# Patient Record
Sex: FEMALE | Race: WHITE | NOT HISPANIC OR LATINO | Employment: FULL TIME | URBAN - METROPOLITAN AREA
[De-identification: names, ages, dates, MRNs, and addresses within clinical notes are randomized per-mention and may not be internally consistent; named-entity substitution may affect disease eponyms.]

---

## 2019-03-27 ENCOUNTER — OFFICE VISIT (OUTPATIENT)
Dept: URGENT CARE | Facility: URGENT CARE | Age: 39
End: 2019-03-27
Payer: COMMERCIAL

## 2019-03-27 ENCOUNTER — ANCILLARY PROCEDURE (OUTPATIENT)
Dept: GENERAL RADIOLOGY | Facility: CLINIC | Age: 39
End: 2019-03-27
Attending: PHYSICIAN ASSISTANT
Payer: COMMERCIAL

## 2019-03-27 VITALS
TEMPERATURE: 98.4 F | DIASTOLIC BLOOD PRESSURE: 64 MMHG | OXYGEN SATURATION: 98 % | WEIGHT: 287 LBS | SYSTOLIC BLOOD PRESSURE: 102 MMHG | HEART RATE: 60 BPM

## 2019-03-27 DIAGNOSIS — R07.81 RIB PAIN ON LEFT SIDE: ICD-10-CM

## 2019-03-27 DIAGNOSIS — R07.81 RIB PAIN ON LEFT SIDE: Primary | ICD-10-CM

## 2019-03-27 LAB
BASOPHILS # BLD AUTO: 0 10E9/L (ref 0–0.2)
BASOPHILS NFR BLD AUTO: 0.3 %
DIFFERENTIAL METHOD BLD: NORMAL
EOSINOPHIL # BLD AUTO: 0.1 10E9/L (ref 0–0.7)
EOSINOPHIL NFR BLD AUTO: 0.8 %
ERYTHROCYTE [DISTWIDTH] IN BLOOD BY AUTOMATED COUNT: 13.6 % (ref 10–15)
HCT VFR BLD AUTO: 40.7 % (ref 35–47)
HGB BLD-MCNC: 13.2 G/DL (ref 11.7–15.7)
LYMPHOCYTES # BLD AUTO: 2.4 10E9/L (ref 0.8–5.3)
LYMPHOCYTES NFR BLD AUTO: 33.3 %
MCH RBC QN AUTO: 27.4 PG (ref 26.5–33)
MCHC RBC AUTO-ENTMCNC: 32.4 G/DL (ref 31.5–36.5)
MCV RBC AUTO: 84 FL (ref 78–100)
MONOCYTES # BLD AUTO: 0.6 10E9/L (ref 0–1.3)
MONOCYTES NFR BLD AUTO: 8.5 %
NEUTROPHILS # BLD AUTO: 4.2 10E9/L (ref 1.6–8.3)
NEUTROPHILS NFR BLD AUTO: 57.1 %
PLATELET # BLD AUTO: 268 10E9/L (ref 150–450)
RBC # BLD AUTO: 4.82 10E12/L (ref 3.8–5.2)
WBC # BLD AUTO: 7.3 10E9/L (ref 4–11)

## 2019-03-27 PROCEDURE — 36415 COLL VENOUS BLD VENIPUNCTURE: CPT | Performed by: PHYSICIAN ASSISTANT

## 2019-03-27 PROCEDURE — 71101 X-RAY EXAM UNILAT RIBS/CHEST: CPT | Mod: LT

## 2019-03-27 PROCEDURE — 99203 OFFICE O/P NEW LOW 30 MIN: CPT | Performed by: PHYSICIAN ASSISTANT

## 2019-03-27 PROCEDURE — 85025 COMPLETE CBC W/AUTO DIFF WBC: CPT | Performed by: PHYSICIAN ASSISTANT

## 2019-03-27 RX ORDER — TRIAMCINOLONE ACETONIDE 1 MG/G
CREAM TOPICAL
COMMUNITY
Start: 2018-10-03 | End: 2022-02-12

## 2019-03-27 RX ORDER — IBUPROFEN 800 MG/1
800 TABLET, FILM COATED ORAL EVERY 8 HOURS PRN
Qty: 15 TABLET | Refills: 0 | Status: SHIPPED | OUTPATIENT
Start: 2019-03-27 | End: 2019-04-01

## 2019-03-27 RX ORDER — CYCLOBENZAPRINE HCL 10 MG
10 TABLET ORAL AT BEDTIME
Qty: 14 TABLET | Refills: 0 | Status: SHIPPED | OUTPATIENT
Start: 2019-03-27 | End: 2019-04-10

## 2019-03-27 RX ORDER — TRAZODONE HYDROCHLORIDE 50 MG/1
50-100 TABLET, FILM COATED ORAL
COMMUNITY
Start: 2018-10-03 | End: 2019-10-03

## 2019-03-27 NOTE — PATIENT INSTRUCTIONS
Follow up right away with any red flag symptoms or with PCP if fail to improve    Patient Education     Rib Contusion or Minor Fracture    A rib contusion is a bruise to one or more rib bones. It may cause pain, tenderness, swelling, and a purplish color to the skin. There may be a sharp pain with each breath. A rib contusion takes anywhere from a few days to a few weeks to heal. A minor rib fracture or break may cause the same symptoms as a rib contusion. The small crack may not be seen on a regular chest X-ray. Treatment for both problems is basically the same.  Home care    You may use over-the-counter pain medicine to control pain, unless another pain medicine was prescribed. If you have chronic liver or kidney disease or ever had a stomach ulcer or GI (gastrointestinal) bleeding, talk with your healthcare provider before using these medicines.    Rest. Don't lift anything heavy or do any activity that causes pain.    Apply an ice pack over the injured area for 15 to 20 minutes every 1 to 2 hours. You should do this for the first 24 to 48 hours. To make an ice pack, put ice cubes in a plastic bag that seals at the top. Wrap the bag in a clean, thin towel or cloth. Never put ice or an ice pack directly on the skin. Continue with ice packs as needed for the relief of pain and swelling.    The first 3 to 4 weeks of healing will be the most painful. If your pain is not under control with the treatment given, call your healthcare provider. Sometimes a stronger pain medicine may be needed. A nerve block can be done in case of severe pain. It will numb the nerve between the ribs.  Follow-up care  Follow up with your healthcare provider, or as advised.  If X-rays were taken, you will be told of any new findings that may affect your care.  Call 911  Call 911 if you have:    Dizziness, weakness or fainting    Shortness of breath with or without chest discomfort    New or worsening pain  When to seek medical advice  Call  your healthcare provider right away if any of these occur:    Fever of 100.4 F (38 C) or higher, or as directed by your healthcare provider    Chills    Stomach pain, vomiting  Date Last Reviewed: 6/1/2018 2000-2018 [employer]. [address] [place] [id number]. All rights reserved. This information is not intended as a substitute for professional medical care. Always follow your healthcare professional's instructions.

## 2019-03-27 NOTE — PROGRESS NOTES
SUBJECTIVE:  Chief Complaint   Patient presents with     Urgent Care     Abdominal Pain     sharp, throbbing, achy pain on LLQ abdominal. 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 Tx: FRANCISCO         Alla Briones is a 38 year old female presents with a chief complaint of left rib pain.  The injury occurred 1 day(s) ago.   The injury happened while at work. How: lifting a child from their crib.   The patient complained of moderate pain  and has had decreased ROM.  Pain exacerbated by twisting and flexion/extension.  Relieved by rest.  She treated it initially with no therapy. This is the first time this type of injury has occurred to this patient.     History reviewed. No pertinent past medical history.  Current Outpatient Medications   Medication Sig Dispense Refill     cyclobenzaprine (FLEXERIL) 10 MG tablet Take 1 tablet (10 mg) by mouth At Bedtime for 14 days 14 tablet 0     hydrocortisone (ANUSOL-HC) 2.5 % cream APPLY RECTALLY TWO TIMES A DAY  0     ibuprofen (ADVIL/MOTRIN) 800 MG tablet Take 1 tablet (800 mg) by mouth every 8 hours as needed for moderate pain 15 tablet 0     traZODone (DESYREL) 50 MG tablet Take  mg by mouth       triamcinolone (KENALOG) 0.1 % external cream        Social History     Tobacco Use     Smoking status: Never Smoker     Smokeless tobacco: Never Used   Substance Use Topics     Alcohol use: Yes     Comment: occasionally       ROS:  Review of systems negative except as stated above.    EXAM:   /64   Pulse 60   Temp 98.4  F (36.9  C) (Oral)   Wt 130.2 kg (287 lb)   SpO2 98%   Gen: healthy,alert,no distress  Ribs: 10th rib acutely tender, no stepoffs    CHEST: clear to auscultation  CV: regular rate and rhythm  MS: no gross deformities noted, no evidence of inflammation in joints, FROM in all extremities.  SKIN: no suspicious lesions or rashes  NEURO: Normal strength and tone, sensory exam grossly normal,  mentation intact and speech normal  Abdomen: no abnormal sounds, tenderness    X-RAY was not done.    ASSESSMENT:   (R07.81) Rib pain on left side  (primary encounter diagnosis)  Plan: CBC with platelets and differential, XR Ribs &         Chest Left G/E 3 Views, ibuprofen         (ADVIL/MOTRIN) 800 MG tablet, cyclobenzaprine         (FLEXERIL) 10 MG tablet  Red flags and emergent follow up discussed, and understood by patient  Follow up with PCP if symptoms worsen or fail to improve      Patient Instructions   Follow up right away with any red flag symptoms or with PCP if fail to improve    Patient Education     Rib Contusion or Minor Fracture    A rib contusion is a bruise to one or more rib bones. It may cause pain, tenderness, swelling, and a purplish color to the skin. There may be a sharp pain with each breath. A rib contusion takes anywhere from a few days to a few weeks to heal. A minor rib fracture or break may cause the same symptoms as a rib contusion. The small crack may not be seen on a regular chest X-ray. Treatment for both problems is basically the same.  Home care    You may use over-the-counter pain medicine to control pain, unless another pain medicine was prescribed. If you have chronic liver or kidney disease or ever had a stomach ulcer or GI (gastrointestinal) bleeding, talk with your healthcare provider before using these medicines.    Rest. Don't lift anything heavy or do any activity that causes pain.    Apply an ice pack over the injured area for 15 to 20 minutes every 1 to 2 hours. You should do this for the first 24 to 48 hours. To make an ice pack, put ice cubes in a plastic bag that seals at the top. Wrap the bag in a clean, thin towel or cloth. Never put ice or an ice pack directly on the skin. Continue with ice packs as needed for the relief of pain and swelling.    The first 3 to 4 weeks of healing will be the most painful. If your pain is not under control with the treatment given,  call your healthcare provider. Sometimes a stronger pain medicine may be needed. A nerve block can be done in case of severe pain. It will numb the nerve between the ribs.  Follow-up care  Follow up with your healthcare provider, or as advised.  If X-rays were taken, you will be told of any new findings that may affect your care.  Call 911  Call 911 if you have:    Dizziness, weakness or fainting    Shortness of breath with or without chest discomfort    New or worsening pain  When to seek medical advice  Call your healthcare provider right away if any of these occur:    Fever of 100.4 F (38 C) or higher, or as directed by your healthcare provider    Chills    Stomach pain, vomiting  Date Last Reviewed: 6/1/2018 2000-2018 [employer]. [address] [place] [address], [place] [address]. All rights reserved. This information is not intended as a substitute for professional medical care. Always follow your healthcare professional's instructions.

## 2019-04-01 ENCOUNTER — NURSE TRIAGE (OUTPATIENT)
Dept: NURSING | Facility: CLINIC | Age: 39
End: 2019-04-01

## 2019-04-01 ENCOUNTER — TELEPHONE (OUTPATIENT)
Dept: URGENT CARE | Facility: URGENT CARE | Age: 39
End: 2019-04-01

## 2019-04-01 NOTE — TELEPHONE ENCOUNTER
Clinic Action Needed:  Please contact patient at [phone].  Reason for Call:  Patient requesting refill of ibuprofen.  1 tablet wasn't providing relief, so patient was taking 2 tablets for the past couple of days.  Preferred pharmacy is [id number].  Routed to:  [facility]    Please close encounter when completed.

## 2019-04-01 NOTE — TELEPHONE ENCOUNTER
Patient requesting refill of ibuprofen.  1 tablet wasn't providing relief, so patient was taking 2 tablets for the past couple of days.  Preferred pharmacy is [facility] #[id number].  Routed to [facility].

## 2019-04-02 NOTE — TELEPHONE ENCOUNTER
Per provider message pt needs to follow up woth her primary care doctor for follow up needs.  Spoke to patient and relayed that message to her.    Christine Zelaya CMA 4/2/2019 1:37 PM

## 2019-10-14 ENCOUNTER — HOSPITAL ENCOUNTER (EMERGENCY)
Facility: CLINIC | Age: 39
Discharge: HOME | End: 2019-10-14
Attending: NURSE PRACTITIONER | Admitting: NURSE PRACTITIONER
Payer: COMMERCIAL

## 2019-10-14 VITALS
HEART RATE: 76 BPM | SYSTOLIC BLOOD PRESSURE: 155 MMHG | DIASTOLIC BLOOD PRESSURE: 97 MMHG | TEMPERATURE: 98.2 F | RESPIRATION RATE: 18 BRPM | OXYGEN SATURATION: 98 %

## 2019-10-14 DIAGNOSIS — S61.209A AVULSION OF FINGER TIP, INITIAL ENCOUNTER: ICD-10-CM

## 2019-10-14 PROCEDURE — 25000125 ZZHC RX 250: Performed by: NURSE PRACTITIONER

## 2019-10-14 PROCEDURE — 25000128 H RX IP 250 OP 636: Performed by: NURSE PRACTITIONER

## 2019-10-14 PROCEDURE — 90715 TDAP VACCINE 7 YRS/> IM: CPT | Performed by: NURSE PRACTITIONER

## 2019-10-14 PROCEDURE — 90471 IMMUNIZATION ADMIN: CPT

## 2019-10-14 PROCEDURE — 99283 EMERGENCY DEPT VISIT LOW MDM: CPT | Mod: 25

## 2019-10-14 RX ORDER — LIDOCAINE HYDROCHLORIDE 20 MG/ML
5 SOLUTION OROPHARYNGEAL ONCE
Status: COMPLETED | OUTPATIENT
Start: 2019-10-14 | End: 2019-10-14

## 2019-10-14 RX ADMIN — LIDOCAINE HYDROCHLORIDE 5 ML: 20 SOLUTION ORAL; TOPICAL at 18:08

## 2019-10-14 RX ADMIN — CLOSTRIDIUM TETANI TOXOID ANTIGEN (FORMALDEHYDE INACTIVATED), CORYNEBACTERIUM DIPHTHERIAE TOXOID ANTIGEN (FORMALDEHYDE INACTIVATED), BORDETELLA PERTUSSIS TOXOID ANTIGEN (GLUTARALDEHYDE INACTIVATED), BORDETELLA PERTUSSIS FILAMENTOUS HEMAGGLUTININ ANTIGEN (FORMALDEHYDE INACTIVATED), BORDETELLA PERTUSSIS PERTACTIN ANTIGEN, AND BORDETELLA PERTUSSIS FIMBRIAE 2/3 ANTIGEN 0.5 ML: 5; 2; 2.5; 5; 3; 5 INJECTION, SUSPENSION INTRAMUSCULAR at 18:52

## 2019-10-14 ASSESSMENT — ENCOUNTER SYMPTOMS
NUMBNESS: 0
WOUND: 1
WEAKNESS: 0
MYALGIAS: 1

## 2019-10-14 NOTE — ED AVS SNAPSHOT
[facility] Emergency Department  [address]  [facility] [address]  Phone:  [phone]  Fax:  [phone]                                    Alla Briones   MRN: [id number]    Department:  [facility] Emergency Department   Date of Visit:  10/14/2019           After Visit Summary Signature Page    I have received my discharge instructions, and my questions have been answered. I have discussed any challenges I see with this plan with the nurse or doctor.    ..........................................................................................................................................  Patient/Patient Representative Signature      ..........................................................................................................................................  Patient Representative Print Name and Relationship to Patient    ..................................................               ................................................  Date                                   Time    ..........................................................................................................................................  Reviewed by Signature/Title    ...................................................              ..............................................  Date                                               Time          22EPIC Rev 08/18

## 2019-10-14 NOTE — ED TRIAGE NOTES
Pt was using a mandolin to cut zucchini and sliced her middle finger of her rt hand, bleeding controlled with pressure.

## 2019-10-14 NOTE — ED PROVIDER NOTES
History     Chief Complaint:  Laceration    HPI  Alal Briones is a right hand dominant 39 year old female who presents to the emergency department today for evaluation of a laceration. The patient reports that she was using a mandolin to cut zucchini when she sliced her right fourth finger on the blade. The bleeding is controlled with pressure applied. She has pain to the tip of the finger but has normal sensation and movement. Last Tetanus was 8/24/2009.    Allergies:  No known drug allergies    Medications:    hydrocortisone (ANUSOL-HC) 2.5 % cream  traZODone (DESYREL) 50 MG tablet  triamcinolone (KENALOG) 0.1 % external cream    Past Medical History:    The patient does not have any past pertinent medical history.    Past Surgical History:    History reviewed. No pertinent surgical history.    Family History:    History reviewed. No pertinent family history.     Social History:  The patient reports that she has never smoked. She has never used smokeless tobacco. She reports current alcohol use. She reports that she does not use drugs.   Marital Status: Single    Review of Systems   Musculoskeletal: Positive for myalgias (right ring finger).   Skin: Positive for wound.   Neurological: Negative for weakness and numbness.   All other systems reviewed and are negative.      Physical Exam     Patient Vitals for the past 24 hrs:   BP Temp Temp src Pulse Resp SpO2   10/14/19 1740 (!) 155/97 98.2  F (36.8  C) Oral 76 18 98 %     Physical Exam  General: Alert, Mild  discomfort, well kept   HENT:  Normal voice, No lymphadenopathy  Eyes:  The pupils are equal, round, and reactive to light, Conjunctiva normal, No scleral icterus   Neck:  Normal range of motion  CV:  Normal Pulses, Normal cap refill  Resp:  Non-labored, No cough  MS:  Distal tip of right ring finger avulsion type laceration.  There is very distal portion of the nail plate involved.  No significant nailbed injury., No indication for Flexor or extensor tendon  injury, Normal ROM if all digits  Skin:  No rash or acute skin lesions noted  Neuro:  Speech is normal and fluent, Normal sensation  Psych: Awake. Alert.  Normal affect.  Appropriate interactions. Good eye contact    Emergency Department Course     Interventions:  1808: Lidocaine 2% 5 mL Topical solution  1852: Tdap 0.5mL IM injection    Emergency Department Course:  Past medical records, nursing notes, and vitals reviewed.  1757: I performed an exam of the patient and obtained history, as documented above. GCS 15.    1842: I rechecked the patient. Findings and plan explained to the Patient. Patient discharged home with instructions regarding supportive care, medications, and reasons to return. The importance of close follow-up was reviewed.     Impression & Plan      Medical Decision Making:  Findings and exam were consistent with uncomplicated avulsion type laceration of right fourth finger. The wound was carefully evaluated and explored. Not sutureable. There is no evidence at this time of bony injury, foreign body, deep space infection, or neurovascular injury. Follow up in 2-3 days time if concerns over healing. Possible complications (infection, scarring) were reviewed with the patient.  Indications for immediate return to ER/UR were reviewed and included but are not limited to, redness, fevers, drainage, increasing pain, high fevers, or other concerns.    Diagnosis:    ICD-10-CM   1. Avulsion of finger tip, initial encounter S61.209A       Disposition:   Discharged.      Scribe Disclosure:  I, India Montes, am serving as a scribe at 6:13 PM on 10/14/2019 to document services personally performed by Malachi Villar APRN based on my observations and the provider's statements to me.     [facility] EMERGENCY DEPARTMENT       Malachi Villar APRN CNP  10/14/19 1923

## 2021-11-10 ENCOUNTER — OFFICE VISIT (OUTPATIENT)
Dept: URGENT CARE | Facility: URGENT CARE | Age: 41
End: 2021-11-10
Payer: OTHER MISCELLANEOUS

## 2021-11-10 ENCOUNTER — ANCILLARY PROCEDURE (OUTPATIENT)
Dept: GENERAL RADIOLOGY | Facility: CLINIC | Age: 41
End: 2021-11-10
Attending: FAMILY MEDICINE
Payer: OTHER MISCELLANEOUS

## 2021-11-10 VITALS
OXYGEN SATURATION: 100 % | TEMPERATURE: 98.2 F | HEART RATE: 59 BPM | SYSTOLIC BLOOD PRESSURE: 136 MMHG | DIASTOLIC BLOOD PRESSURE: 71 MMHG

## 2021-11-10 DIAGNOSIS — R51.9 ACUTE NONINTRACTABLE HEADACHE, UNSPECIFIED HEADACHE TYPE: ICD-10-CM

## 2021-11-10 DIAGNOSIS — S09.92XA NOSE INJURY, INITIAL ENCOUNTER: Primary | ICD-10-CM

## 2021-11-10 DIAGNOSIS — S09.92XA NOSE INJURY, INITIAL ENCOUNTER: ICD-10-CM

## 2021-11-10 PROCEDURE — 99213 OFFICE O/P EST LOW 20 MIN: CPT | Performed by: FAMILY MEDICINE

## 2021-11-10 PROCEDURE — 70160 X-RAY EXAM OF NASAL BONES: CPT | Performed by: RADIOLOGY

## 2021-11-10 RX ORDER — TRAZODONE HYDROCHLORIDE 100 MG/1
50-150 TABLET ORAL
COMMUNITY
Start: 2021-07-15 | End: 2024-02-12

## 2021-11-10 RX ORDER — ACYCLOVIR 400 MG/1
TABLET ORAL
COMMUNITY
Start: 2021-05-24 | End: 2023-03-18

## 2021-11-10 RX ORDER — SERTRALINE HYDROCHLORIDE 100 MG/1
150 TABLET, FILM COATED ORAL
COMMUNITY
Start: 2021-07-06
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
## 2022-02-12 ENCOUNTER — OFFICE VISIT (OUTPATIENT)
Dept: URGENT CARE | Facility: URGENT CARE | Age: 42
End: 2022-02-12
Payer: COMMERCIAL

## 2022-02-12 VITALS
HEART RATE: 78 BPM | SYSTOLIC BLOOD PRESSURE: 138 MMHG | OXYGEN SATURATION: 98 % | DIASTOLIC BLOOD PRESSURE: 84 MMHG | TEMPERATURE: 98 F | RESPIRATION RATE: 20 BRPM

## 2022-02-12 DIAGNOSIS — R10.9 RIGHT FLANK PAIN: ICD-10-CM

## 2022-02-12 DIAGNOSIS — R68.83 CHILLS: ICD-10-CM

## 2022-02-12 DIAGNOSIS — B96.89 BV (BACTERIAL VAGINOSIS): ICD-10-CM

## 2022-02-12 DIAGNOSIS — R50.9 FEVER IN ADULT: ICD-10-CM

## 2022-02-12 DIAGNOSIS — R35.0 URINARY FREQUENCY: ICD-10-CM

## 2022-02-12 DIAGNOSIS — R30.0 DYSURIA: ICD-10-CM

## 2022-02-12 DIAGNOSIS — N10 PYELONEPHRITIS, ACUTE: Primary | ICD-10-CM

## 2022-02-12 DIAGNOSIS — N76.0 BV (BACTERIAL VAGINOSIS): ICD-10-CM

## 2022-02-12 LAB
ALBUMIN UR-MCNC: >=300 MG/DL
APPEARANCE UR: ABNORMAL
BACTERIA #/AREA URNS HPF: ABNORMAL /HPF
BASOPHILS # BLD AUTO: 0 10E3/UL (ref 0–0.2)
BASOPHILS NFR BLD AUTO: 0 %
BILIRUB UR QL STRIP: NEGATIVE
CLUE CELLS: PRESENT
COLOR UR AUTO: YELLOW
EOSINOPHIL # BLD AUTO: 0 10E3/UL (ref 0–0.7)
EOSINOPHIL NFR BLD AUTO: 0 %
ERYTHROCYTE [DISTWIDTH] IN BLOOD BY AUTOMATED COUNT: 13.2 % (ref 10–15)
GLUCOSE UR STRIP-MCNC: NEGATIVE MG/DL
HCT VFR BLD AUTO: 42.5 % (ref 35–47)
HGB BLD-MCNC: 13.8 G/DL (ref 11.7–15.7)
HGB UR QL STRIP: ABNORMAL
KETONES UR STRIP-MCNC: 15 MG/DL
LEUKOCYTE ESTERASE UR QL STRIP: ABNORMAL
LYMPHOCYTES # BLD AUTO: 1.4 10E3/UL (ref 0.8–5.3)
LYMPHOCYTES NFR BLD AUTO: 15 %
MCH RBC QN AUTO: 27.8 PG (ref 26.5–33)
MCHC RBC AUTO-ENTMCNC: 32.5 G/DL (ref 31.5–36.5)
MCV RBC AUTO: 86 FL (ref 78–100)
MONOCYTES # BLD AUTO: 0.9 10E3/UL (ref 0–1.3)
MONOCYTES NFR BLD AUTO: 9 %
NEUTROPHILS # BLD AUTO: 7.4 10E3/UL (ref 1.6–8.3)
NEUTROPHILS NFR BLD AUTO: 76 %
NITRATE UR QL: POSITIVE
PH UR STRIP: 5.5 [PH] (ref 5–7)
PLATELET # BLD AUTO: 252 10E3/UL (ref 150–450)
RBC # BLD AUTO: 4.96 10E6/UL (ref 3.8–5.2)
RBC #/AREA URNS AUTO: >100 /HPF
SP GR UR STRIP: 1.02 (ref 1–1.03)
SQUAMOUS #/AREA URNS AUTO: ABNORMAL /LPF
TRICHOMONAS, WET PREP: ABNORMAL
UROBILINOGEN UR STRIP-ACNC: 1 E.U./DL
WBC # BLD AUTO: 9.7 10E3/UL (ref 4–11)
WBC #/AREA URNS AUTO: >100 /HPF
WBC'S/HIGH POWER FIELD, WET PREP: ABNORMAL
YEAST, WET PREP: ABNORMAL

## 2022-02-12 PROCEDURE — 87210 SMEAR WET MOUNT SALINE/INK: CPT

## 2022-02-12 PROCEDURE — 81001 URINALYSIS AUTO W/SCOPE: CPT

## 2022-02-12 PROCEDURE — 36415 COLL VENOUS BLD VENIPUNCTURE: CPT | Performed by: PHYSICIAN ASSISTANT

## 2022-02-12 PROCEDURE — 80048 BASIC METABOLIC PNL TOTAL CA: CPT | Performed by: PHYSICIAN ASSISTANT

## 2022-02-12 PROCEDURE — 99215 OFFICE O/P EST HI 40 MIN: CPT | Mod: 25 | Performed by: PHYSICIAN ASSISTANT

## 2022-02-12 PROCEDURE — 87186 SC STD MICRODIL/AGAR DIL: CPT | Performed by: PHYSICIAN ASSISTANT

## 2022-02-12 PROCEDURE — 87086 URINE CULTURE/COLONY COUNT: CPT | Performed by: PHYSICIAN ASSISTANT

## 2022-02-12 PROCEDURE — 96372 THER/PROPH/DIAG INJ SC/IM: CPT | Performed by: PHYSICIAN ASSISTANT

## 2022-02-12 PROCEDURE — 87040 BLOOD CULTURE FOR BACTERIA: CPT | Performed by: PHYSICIAN ASSISTANT

## 2022-02-12 PROCEDURE — 85025 COMPLETE CBC W/AUTO DIFF WBC: CPT | Performed by: PHYSICIAN ASSISTANT

## 2022-02-12 RX ORDER — CEFDINIR 300 MG/1
300 CAPSULE ORAL 2 TIMES DAILY
Qty: 20 CAPSULE | Refills: 0 | Status: SHIPPED | OUTPATIENT
Start: 2022-02-12 | End: 2022-02-22

## 2022-02-12 RX ORDER — METRONIDAZOLE 7.5 MG/G
1 GEL VAGINAL DAILY
Qty: 25 G | Refills: 0 | Status: SHIPPED | OUTPATIENT
Start: 2022-02-12 | End: 2022-02-17

## 2022-02-12 RX ORDER — CEFTRIAXONE SODIUM 1 G
1 VIAL (EA) INJECTION ONCE
Status: COMPLETED | OUTPATIENT
Start: 2022-02-12 | End: 2022-02-12

## 2022-02-12 RX ADMIN — Medication 1 G: at 20:32

## 2022-02-13 LAB
ANION GAP SERPL CALCULATED.3IONS-SCNC: 6 MMOL/L (ref 3–14)
BUN SERPL-MCNC: 13 MG/DL (ref 7–30)
CALCIUM SERPL-MCNC: 9.2 MG/DL (ref 8.5–10.1)
CHLORIDE BLD-SCNC: 106 MMOL/L (ref 94–109)
CO2 SERPL-SCNC: 25 MMOL/L (ref 20–32)
CREAT SERPL-MCNC: 0.77 MG/DL (ref 0.52–1.04)
GFR SERPL CREATININE-BSD FRML MDRD: >90 ML/MIN/1.73M2
GLUCOSE BLD-MCNC: 101 MG/DL (ref 70–99)
POTASSIUM BLD-SCNC: 3.8 MMOL/L (ref 3.4–5.3)
SODIUM SERPL-SCNC: 137 MMOL/L (ref 133–144)

## 2022-02-13 NOTE — PATIENT INSTRUCTIONS
February 12, 2022 [facility]:     You were diagnosed with a kidney infection here today.  A kidney infection is a serious infection that has the potential to  spread into the bloodstream and become a life threatening infection, if not treated.     -You were given an injection of a strong antibiotic (Rocephin) in the muscle here today     -Please start the antibiotic I prescribed for you (Omnicef/Cefdinir)     -You should follow-up with your primary care provider on Monday (2 days from now) for a recheck and to review today's lab test results and your pending blood culture and urine culture results.     -If you develop any worsening of your symptoms, after the treatment provided here today, or if you develop any of the below, go directly to the emergency room:       Repeated vomiting    Increased fever/chills    Not able to take prescribed medicine due to nausea or another reason    Bloody, dark-colored, or foul smelling urine    Trouble urinating or decreased urine output    No urine for 8 hours, no tears when crying, confusion, sunken eyes, or dry mouth    Call 911  Call 911 if any of the following occur:    Trouble breathing    Fainting or loss of consciousness    Rapid or very slow heart rate    Weakness, dizziness, or fainting    Trouble arousing or confusion    You were also diagnosed with a vaginal infection (BV) today. I prescribed a vaginal gel to treat this infection.     Patient Education     Kidney Infection (Adult Female)     An infection in one or both kidneys is called pyelonephritis. It usually happens when bacteria ) get into the kidney. Rarely it is caused when other germs such as viruses, fungi, or other disease-causing organisms get into the kidney. The bacteria or other disease-causing organisms can enter the kidneys from the bladder or blood traveling from other parts of the body. A kidney infection can become serious. It can cause severe illness, scarring of the kidneys, or kidney  failure if not treated correctly.  Common causes for this problem include:    Not keeping the genital area clean and dry, which promotes the growth of bacteria    Wiping back to front. This drags bacteria from the rectum toward the urinary opening (urethra).    Wearing tight pants or underwear. This lets moisture build up in the genital area, which helps bacteria grow.    Holding urine in for long periods of time    Dehydration    Urinary tract infections    Blockages of urine draining from the kidney, such as a kidney stone  Kidney infections can cause symptoms similar to a bladder infection. Symptoms include:    Pain (or burning) when urinating    Having to urinate more often than usual    Blood in the urine (pink or red)    Belly (abdominal) pain or discomfort, usually in the lower abdomen    Pain in the side or back    Pain above the pubic bone    Fever or chills    Vomiting    Loss of appetite  Treatment is oral antibiotics. More severe cases are treated with intramuscular or IV (intravenous) antibiotics. These are started right away and may be changed once urine culture results show the infecting organisms. Treatment helps prevent a more serious kidney infection. Symptoms of kidney infections can vary based on your age.  Medicines  Medicines can help in the treatment of a bladder infection:    Take antibiotics exactly as prescribed and until they are used up, even if you feel better. It's important to finish them to make sure the infection is gone.    Unless another medicine was prescribed, you can use over-the-counter medicines for pain, fever, or discomfort. If you have chronic liver of kidney disease, talk with your healthcare provider before using these medicines. Also talk with your provider if you've ever had a stomach ulcer or digestive bleeding, or are taking blood thinners.  Home care  The following are general care guidelines:    Stay home from work or school. Rest in bed until your fever breaks and  you are feeling better, or as advised by your healthcare provider.    Drink lots of fluid unless you must restrict fluids for other medical reasons. This will force the medicine into your urinary system and flush the bacteria out of your body. Ask your healthcare provider how much you should drink.    Don't have sex until you have finished all of your medicine and your symptoms are gone.    Don't have caffeine, alcohol, or spicy foods. These foods may irritate the kidneys and bladder.    Don't take bubble baths. Sensitivity to the chemicals in bubble baths can irritate the urethra.    Make sure you wipe from front to back after using the toilet.    Wear loose cloths and cotton underwear.  Prevention  These self-care steps can help prevent future infections:    Drink plenty of fluids to prevent dehydration and flush out the bladder. Do this unless you must restrict fluids for other health reasons, or your healthcare provider told you not to.    Correct cleaning after going to the bathroom in important. Make sure you wipe from front to back after using the toilet.    Urinate more often. Don't try to hold urine in for a long time.    Don't wear tight-fitting pants and underwear.    Improve your diet to prevent constipation. Eat more fruits, vegetables, and fiber. Eat less junk and fatty foods. Constipation can make a urinary tract infection more likely. Talk with your healthcare provider if you have trouble with bowel movements.    Urinate right after sex to flush out the bladder.  Follow-up care  Follow up with your healthcare provider, or as advised. Additional testing may be needed to make sure the infection has cleared. Close follow-up and further testing is very important to find the cause and to prevent future infections.  If a urine culture was done, you will be contacted if your treatment needs to be changed. If directed, you may call to find out the results.  If you had an X-ray, CT scan, or other diagnostic  test, you will be notified of any new findings that may affect your care.  Call 911  Call 911 if any of the following occur:    Trouble breathing    Fainting or loss of consciousness    Rapid or very slow heart rate    Weakness, dizziness, or fainting    Trouble arousing or confusion  When to seek medical advice  Call your healthcare provider right away if any of these occur:    Fever 100.4 F (38 C) or higher, or as directed by your healthcare provider    Not feeling better or symptoms get worse within 1 to 2 days after starting antibiotics    Any symptom that continues after 3 days of treatment    Increasing pain in the stomach, back, side, or groin area    Repeated vomiting    Not able to take prescribed medicine due to nausea or another reason    Bloody, dark-colored, or foul smelling urine    Trouble urinating or decreased urine output    No urine for 8 hours, no tears when crying, confusion, sunken eyes, or dry mouth  Sadia last reviewed this educational content on 11/1/2019 2000-2021 [employer]. All rights reserved. This information is not intended as a substitute for professional medical care. Always follow your healthcare professional's instructions.               Patient Education     Bacterial Vaginosis    You have a vaginal infection called bacterial vaginosis (BV). Both good and bad bacteria are present in a healthy vagina. BV occurs when these bacteria get out of balance. The number of bad bacteria increase. And the number of good bacteria decrease. BV is linked with sexual activity, but it's not a sexually transmitted infection (STI).   BV may or may not cause symptoms. If symptoms do occur, they can include:     Thin, gray, milky-white, or sometimes green discharge    Unpleasant odor or  fishy  smell    Itching, burning, or pain in or around the vagina  It is not known what causes BV, but certain factors can make the problem more likely. These can include:      Douching    Spermicides    Use of antibiotics    Change in hormone levels with pregnancy, breastfeeding, or menopause    Having sex with a new partner    Having sex with more than one partner  BV will sometimes go away on its own. But treatment is often advised. This is because untreated BV can raise the risk of more serious health problems such as:     Pelvic inflammatory disease (PID)     delivery (giving birth to a baby early if you re pregnant)    HIV and some other sexually transmitted infections (STIs)    Infection after surgery on the reproductive organs  Home care  General care    BV is most often treated with medicines called antibiotics. These may be given as pills or as a vaginal cream. If antibiotics are prescribed, be sure to use them exactly as directed. And complete all of the medicine, even if your symptoms go away.    Don't douche or having sex during treatment.    If you have sex with a female partner, ask your healthcare provider if she should also be treated.  Prevention    Don't douche.    Don't have sex. If you do have sex, then take steps to lower your risk:  ? Use condoms when having sex.  ? Limit the number of sex partners you have.    Follow-up care  Follow up with your healthcare provider, or as advised.   When to get medical advice  Call your healthcare provider right away if:     You have a fever of 100.4 F (38 C) or higher, or as directed by your provider.    Your symptoms get worse, or they don t go away within a few days of starting treatment.    You have new pain in the lower belly or pelvic region.    You have side effects that bother you or a reaction to the pills or cream you re prescribed.    You or any of your sex partners have new symptoms, such as a rash, joint pain, or sores.  [employer]afgen last reviewed this educational content on 2020-2021 [employer]. All rights reserved. This information is not intended as a substitute for professional  medical care. Always follow your healthcare professional's instructions.

## 2022-02-13 NOTE — PROGRESS NOTES
ASSESSMENT/PLAN:    (N10) Pyelonephritis, acute  (primary encounter diagnosis)  MDM: Acute onset dysuria and urinary urgency/frequency x 5 days, sudden interval worsening with development of fever, shaking chills, and flank pain tonight. Above consistent with pyelonephritis. UA nitrite positive, >100 WBC, >100 RBCs. CBC with Diff WINL. BMP pending (tech unable to run here tonight), Wet prep positive for clue cells. Urine culture and blood cultures x 2 pending.    All above is reviewed with patient today. Copies of all tonight's lab test results are provided to hand carry to non-[place] PCP follow-up visit. Please see below patient discharge summary.     Plan: CBC with platelets and differential, Basic         metabolic panel  (Ca, Cl, CO2, Creat, Gluc, K,         Na, BUN), Blood Culture Peripheral Blood, Blood        Culture Peripheral Blood, cefTRIAXone         (ROCEPHIN) injection 1 g, cefdinir (OMNICEF)         300 MG capsule    February 12, 2022 [facility]:     You were diagnosed with a kidney infection here today.  A kidney infection is a serious infection that has the potential to  spread into the bloodstream and become a life threatening infection, if not treated.     -You were given an injection of a strong antibiotic (Rocephin) in the muscle here today     -Please start the antibiotic I prescribed for you (Omnicef/Cefdinir)     -You should follow-up with your primary care provider on Monday (2 days from now) for a recheck and to review today's lab test results and your pending blood culture and urine culture results.     -If you develop any worsening of your symptoms, after the treatment provided here today, or if you develop any of the below, go directly to the emergency room:       Repeated vomiting    Increased fever/chills    Not able to take prescribed medicine due to nausea or another reason    Bloody, dark-colored, or foul smelling urine    Trouble urinating or decreased urine  output    No urine for 8 hours, no tears when crying, confusion, sunken eyes, or dry mouth    Call 911  Call 911 if any of the following occur:    Trouble breathing    Fainting or loss of consciousness    Rapid or very slow heart rate    Weakness, dizziness, or fainting    Trouble arousing or confusion    You were also diagnosed with a vaginal infection (BV) today. I prescribed a vaginal gel to treat this infection.     (R10.9) Right flank pain  Plan: CBC with platelets and differential, Basic         metabolic panel  (Ca, Cl, CO2, Creat, Gluc, K,         Na, BUN), Blood Culture Peripheral Blood, Blood        Culture Peripheral Blood      (R50.9) Fever in adult  Plan: CBC with platelets and differential, Basic         metabolic panel  (Ca, Cl, CO2, Creat, Gluc, K,         Na, BUN), Blood Culture Peripheral Blood, Blood        Culture Peripheral Blood      (R35.0) Urinary frequency  Plan: UA reflex to Microscopic and Culture, Wet         preparation, Urine Microscopic Exam, Urine         Culture, CBC with platelets and differential,         Basic metabolic panel  (Ca, Cl, CO2, Creat,         Gluc, K, Na, BUN), Blood Culture Peripheral         Blood, Blood Culture Peripheral Blood      (N76.0,  B96.89) BV (bacterial vaginosis)  Plan: metroNIDAZOLE (METROGEL) 0.75 % vaginal gel      (R68.83) Chills  Plan: CBC with platelets and differential, Basic         metabolic panel  (Ca, Cl, CO2, Creat, Gluc, K,         Na, BUN), Blood Culture Peripheral Blood, Blood        Culture Peripheral Blood      (R30.0) Dysuria    ------------------------------------------------------------      Alla Briones is a very pleasant 41 year old female who presents to clinic today for evaluation of acute dysuria, urinary urgency/frequency and suprapubic area pressure x approximately 5 days duration. Tonight, she developed acute onset fever (100.4 with shaking chills), and right flank pain.    Of note: patient states she had an E-Visit with PCP  ([facility]) that included lab urinalysis. Patient states there were some abnormal findings, but PCP did not suspect UTI per patient report. She was reportedly treated with fluconazole (but had no symptoms of vaginal itching, abnormal vaginal discharge, or other GYN symptoms) at her E-Visit 3 days ago.     Past Urologic History: No prior history of kidney infections or kidney stones         ROS:     CONSITUTIONAL: Temperature of 100.4 tonight, along with chills/shaking. No acute onset weakness  CARDIAC: No acute chest pain or shortness of breath   RESP: No acute cough, chest pain or shortness of breath   GI: Positive for right flank pain with some radiation to right side. No other abdominal pain.  No acute onset vomiting/diarrhea  GYN: Patient states onset of above UTI symptoms began after an episode of anal intercourse, followed by vaginal intercourse. Patient wonders aloud if that might be source of UTI. LMP: s/p hysterectomy.Patient is offered, but declines STI screening. No vaginal odor or vaginal discharge. No vaginal pain or itching.   SKIN: No systemic rash or hives.   NEURO: No acute onset confusion or mental status changes since urinary symptoms began       History reviewed. No pertinent past medical history.    Current Outpatient Medications   Medication     acyclovir (ZOVIRAX) 400 MG tablet     sertraline (ZOLOFT) 100 MG tablet     traZODone (DESYREL) 100 MG tablet     No current facility-administered medications for this visit.         No Known Allergies          OBJECTIVE:  /84   Pulse 78   Temp 98  F (36.7  C)   Resp 20   SpO2 98%     Patient took OTC Aleve prior to visit here today       GENERAL:  Very pleasant. No acute distress  SKIN: No rashes.  Normal color.  Sclera clear.  CARDIAC:NORMAL - regular rate and rhythm without murmur., normal s1/s2 and without extra heart sounds  RESP: Normal - CTA without rales, rhonchi, or wheezing.  ABDOMEN:  Soft, non-tender, non-distended.  Positive  normal bowel sounds.  No HSM or masses.  Positive, mild, suprapubic tenderness.  Positive for right flank pain  NEURO: Alert and oriented.  Normal speech and mentation.  CN II/XII grossly intact.  Gait within normal limits.        Component      Latest Ref Rng & Units 2/12/2022   Color Urine      Colorless, Straw, Light Yellow, Yellow Yellow   Appearance Urine      Clear Cloudy (A)   Glucose Urine      Negative mg/dL Negative   Bilirubin Urine      Negative Negative   Ketones Urine      Negative mg/dL 15 (A)   Specific Gravity Urine      1.003 - 1.035 1.025   Blood Urine      Negative Large (A)   pH Urine      5.0 - 7.0 5.5   Protein Albumin Urine      Negative mg/dL >=300 (A)   Urobilinogen Urine      0.2, 1.0 E.U./dL 1.0   Nitrite Urine      Negative Positive (A)   Leukocyte Esterase Urine      Negative Small (A)   Bacteria Urine      None Seen /HPF Many (A)   RBC Urine      0-2 /HPF /HPF >100 (A)   WBC Urine      0-5 /HPF /HPF >100 (A)   Squamous Epithelial /LPF Urine      None Seen /LPF Few (A)     Component      Latest Ref Rng & Units 2/12/2022   Trichomonas      Absent Absent   Yeast      Absent Absent   Clue cells      Absent Present (A)   WBCs/high power field      None 2+ (A)     Component      Latest Ref Rng & Units 2/12/2022   WBC      4.0 - 11.0 10e3/uL 9.7   RBC Count      3.80 - 5.20 10e6/uL 4.96   Hemoglobin      11.7 - 15.7 g/dL 13.8   Hematocrit      35.0 - 47.0 % 42.5   MCV      78 - 100 fL 86   MCH      26.5 - 33.0 pg 27.8   MCHC      31.5 - 36.5 g/dL 32.5   RDW      10.0 - 15.0 % 13.2   Platelet Count      150 - 450 10e3/uL 252   % Neutrophils      % 76   % Lymphocytes      % 15   % Monocytes      % 9   % Eosinophils      % 0   % Basophils      % 0   Absolute Neutrophils      1.6 - 8.3 10e3/uL 7.4   Absolute Lymphocytes      0.8 - 5.3 10e3/uL 1.4   Absolute Monocytes      0.0 - 1.3 10e3/uL 0.9   Absolute Eosinophils      0.0 - 0.7 10e3/uL 0.0   Absolute Basophils      0.0 - 0.2 10e3/uL 0.0  "    Basic Metabolic Panel:  is unable to run STAT BMP here today/states machine is \"down\". BMP will be sent out for testing         "

## 2022-02-14 LAB — BACTERIA UR CULT: ABNORMAL

## 2022-02-18 LAB
BACTERIA BLD CULT: NO GROWTH
BACTERIA BLD CULT: NO GROWTH

## 2022-02-19 ENCOUNTER — HEALTH MAINTENANCE LETTER (OUTPATIENT)
Age: 42
End: 2022-02-19

## 2022-10-22 ENCOUNTER — HEALTH MAINTENANCE LETTER (OUTPATIENT)
Age: 42
End: 2022-10-22

## 2023-02-20 ENCOUNTER — HOSPITAL ENCOUNTER (EMERGENCY)
Facility: CLINIC | Age: 43
Discharge: HOME | End: 2023-02-20
Payer: COMMERCIAL

## 2023-03-18 ENCOUNTER — OFFICE VISIT (OUTPATIENT)
Dept: URGENT CARE | Facility: URGENT CARE | Age: 43
End: 2023-03-18
Payer: COMMERCIAL

## 2023-03-18 VITALS
TEMPERATURE: 98.8 F | HEART RATE: 83 BPM | OXYGEN SATURATION: 97 % | DIASTOLIC BLOOD PRESSURE: 64 MMHG | SYSTOLIC BLOOD PRESSURE: 127 MMHG | RESPIRATION RATE: 18 BRPM

## 2023-03-18 DIAGNOSIS — S01.511A LACERATION OF LOWER LIP, INITIAL ENCOUNTER: ICD-10-CM
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
## 2023-03-18 DIAGNOSIS — S01.551A DOG BITE OF SKIN OF LIP, INITIAL ENCOUNTER: Primary | ICD-10-CM

## 2023-03-18 PROCEDURE — 12011 RPR F/E/E/N/L/M 2.5 CM/<: CPT | Performed by: PHYSICIAN ASSISTANT

## 2023-03-18 ASSESSMENT — PAIN SCALES - GENERAL: PAINLEVEL: MILD PAIN (3)

## 2023-03-18 NOTE — PROGRESS NOTES
SUBJECTIVE:  Alla Briones is a 42 year old female who comes in with concerns for a laceration to her lower left on the left side sustained earlier today.  Patient was playing with her 3-month-old puppy when he got excited came up and bit her in the lip.  She sustained a laceration.  Has used peroxide for treatment.  She did not sustain any other injury.  The dog's immunizations are up-to-date.  Patient's tetanus shot is current in 2019.  She is otherwise in normal state of good health    PMH  Depression anxiety     Current Outpatient Medications   Medication     amoxicillin-clavulanate (AUGMENTIN) 875-125 MG tablet     sertraline (ZOLOFT) 100 MG tablet     traZODone (DESYREL) 100 MG tablet     No current facility-administered medications for this visit.     Social History     Socioeconomic History     Marital status: Single     Spouse name: Not on file     Number of children: Not on file     Years of education: Not on file     Highest education level: Not on file   Occupational History     Not on file   Tobacco Use     Smoking status: Never     Smokeless tobacco: Never   Substance and Sexual Activity     Alcohol use: Yes     Comment: occasionally     Drug use: No     Sexual activity: Yes     Birth control/protection: None   Other Topics Concern     Not on file   Social History Narrative     Not on file     Social Determinants of Health     Financial Resource Strain: Not on file   Food Insecurity: Not on file   Transportation Needs: Not on file   Physical Activity: Not on file   Stress: Not on file   Social Connections: Not on file   Intimate Partner Violence: Not on file   Housing Stability: Not on file      ROS  Negative other than stated above    Exam:  GENERAL APPEARANCE: healthy, alert and no distress  EYES: EOMI,  PERRL  HENT: Oral mucosa moist with no erythema noted.  Handling oral secretions well  SKIN: Patient with a 1 cm laceration to the left lower lip.  This does cross the vermilion border.  Wound edges  are very easily approximated and are not under any tension.    assessment/plan:  (S01.551A,  W54.0XXA) Dog bite of skin of lip, initial encounter  (primary encounter diagnosis)  Comment:   Plan: amoxicillin-clavulanate (AUGMENTIN) 875-125 MG         tablet        Patient sustained a dog bite to her left lower lip sustained earlier today.  Immunizations are up today for dog and patient.  Due to bite will place on Augmentin as a prevention.  Signs of infection were discussed.    (S01.511A) Laceration of lower lip, initial encounter  Comment:   Plan: amoxicillin-clavulanate (AUGMENTIN) 875-125 MG         tablet, REPAIR SUPERFICIAL, WOUND FACE/EAR <2.5        CM          Patient with laceration as above.  Wound edges are very easily approximated and not under tension.  Did discuss that it does cross the vermilion border and will likely have some scarring.  Discussed placing sutures versus Dermabond.  Patient would like Dermabond.  Dermabond was applied with very good alignment.  Tolerated well.  Should have good cosmetic results.  Did discuss likelihood of scarring and may use over-the-counter scar reducing cream once the glue falls off.  Advised that over the next couple days she minimizes any extreme mouth movements to keep the glue intact.  We will keep dry also for 48 hours.  All questions were answered.  We will follow-up as needed

## 2023-08-13 ENCOUNTER — OFFICE VISIT (OUTPATIENT)
Dept: URGENT CARE | Facility: URGENT CARE | Age: 43
End: 2023-08-13
Payer: COMMERCIAL

## 2023-08-13 VITALS
DIASTOLIC BLOOD PRESSURE: 82 MMHG | HEART RATE: 72 BPM | TEMPERATURE: 97.4 F | OXYGEN SATURATION: 100 % | RESPIRATION RATE: 16 BRPM | SYSTOLIC BLOOD PRESSURE: 110 MMHG

## 2023-08-13 DIAGNOSIS — R07.81 RIB PAIN ON LEFT SIDE: Primary | ICD-10-CM

## 2023-08-13 PROCEDURE — 99213 OFFICE O/P EST LOW 20 MIN: CPT | Performed by: PHYSICIAN ASSISTANT

## 2023-08-13 RX ORDER — ESCITALOPRAM OXALATE 20 MG/1
10 TABLET ORAL DAILY
COMMUNITY

## 2023-08-13 RX ORDER — SEMAGLUTIDE 0.5 MG/.5ML
0.5 INJECTION, SOLUTION SUBCUTANEOUS WEEKLY
COMMUNITY
Start: 2023-07-21

## 2023-08-13 NOTE — PATIENT INSTRUCTIONS
"    August 13, 2023[facility]:         1. You can take Over-The-Counter Tylenol (also called Acetaminophen) 650 mg, alternating 4 hours later with Ibuprofen 600 mg.     Do not take more than 3,000 mg Tylenol in 24 hours.     2.  Home breathing exercises to prevent atelectasis and to reduce chance of secondary pneumonia (as we reviewed today)    3. Use of small pillow for \"splinting\" prn and home comfort care measures reviewed.     4. Call 911  Call 911 if you have:  Dizziness, weakness or fainting  Shortness of breath with or without chest discomfort  New or worsening pain  Follow-up Immediately if any of the below:   Call your healthcare provider right away if any of these occur:  Fever of 100.4 F (38 C) or higher, or as directed by your healthcare provider  Stomach pain, vomiting  "

## 2023-08-13 NOTE — PROGRESS NOTES
"    ASSESSMENT/PLAN:    (R07.81) Rib pain on left side  (primary encounter diagnosis)    MDM: Rib contusion versus rib fracture related to injury as described above.  Patient has good breath sounds throughout, normal respiratory rate and an O2 saturation of 100% today (which does not suggest any associated traumatic rib puncture).The option of chest x-ray with rib detail x-ray imaging is offered to patient today.  Patient declines.  Please see the below patient discharge summary/plan (that was reviewed verbally and provided in [facility] for home review).    Plan:       August 13, 2023[facility] Plan:         1. You can take Over-The-Counter Tylenol (also called Acetaminophen) 650 mg, alternating 4 hours later with Ibuprofen 600 mg.     Do not take more than 3,000 mg Tylenol in 24 hours.     2.  Home breathing exercises to prevent atelectasis and to reduce chance of secondary pneumonia (as we reviewed today)    3. Use of small pillow for \"splinting\" prn and home comfort care measures reviewed.     4. Call 911  Call 911 if you have:  Dizziness, weakness or fainting  Shortness of breath with or without chest discomfort  New or worsening pain  Follow-up Immediately if any of the below:   Call your healthcare provider right away if any of these occur:  Fever of 100.4 F (38 C) or higher, or as directed by your healthcare provider  Stomach pain, vomiting        This progress note has been dictated, with use of voice recognition software. Any grammatical, typographical, or context errors are unintentional and inherent to use of voice recognition software.  ----------          Chief Complaint   Patient presents with    Rib Pain     Yesterday, belle diving and foot slipped and belly flopped, left rib pain-painful to breath, left side neck pain and ear pain       SUBJECTIVE:     Alla Briones is a 43 year old female who presents with a chief complaint of left  sided rib pain related to an injury.     HPI: States she was " "belle diving yesterday--had an awkward takeoff when her foot slipped--and ended up doing a \"belly flop\" on her left side.  She estimates this occurred from a height of approximately 20 feet.  She reports left-sided rib pain after the above.  She denies any associated loss of consciousness, neck pain, or other focal sites of pain.  However -patient states, \"The whole left side of my body feels bruised.\"        Pain exacerbated by coughing, sneezing, laughing (rated at 8/10)  Improved, but not relieved by sitting (rated at 6/10)   Initial Home Tx: no tx thus far         ROS:     Cardiac: No acute chest pain or shortness of breath.  No acute racing or irregular heartbeats.  Denies any sudden decrease in exertional tolerance.    SKin: Denies any lacerations or bruising   GI: denies any abdominal pain, N/V  Neuro: Denies any stiff neck, HA or concussion sxs     No past medical history on file.    Current Outpatient Medications   Medication    escitalopram (LEXAPRO) 20 MG tablet    traZODone (DESYREL) 100 MG tablet    WEGOVY 0.5 MG/0.5ML pen    sertraline (ZOLOFT) 100 MG tablet     No current facility-administered medications for this visit.       No Known Allergies          EXAM:   /82   Pulse 72   Temp 97.4  F (36.3  C)   Resp 16   SpO2 100%         Gen: Alert.  Appears somewhat uncomfortable when changing positions but appears comfortable sitting. NAD   CARDIAC:NORMAL - regular rate and rhythm without murmur., normal s1/s2 and without extra heart sounds  NECK: Trachea midline. FROM. Non-tender over cervical midline. No neck stiffness.   RESP: Positive for left-sided rib pain along anterior axillary line on palpation.  No redness, swelling, or bruising.  Normal - CTA without rales, rhonchi, or wheezing. Specifically good air movement into all listening areas today.   SKIN: no suspicious lesions or rashes  NEURO: Normal strength and tone, sensory exam grossly normal, mentation intact and speech normal     " X-RAY: The option of chest x-ray with rib detail x-ray imaging is offered to patient today.  Patient declines

## 2024-02-12 ENCOUNTER — OFFICE VISIT (OUTPATIENT)
Dept: URGENT CARE | Facility: URGENT CARE | Age: 44
End: 2024-02-12
Payer: COMMERCIAL

## 2024-02-12 VITALS
DIASTOLIC BLOOD PRESSURE: 79 MMHG | TEMPERATURE: 98.9 F | SYSTOLIC BLOOD PRESSURE: 139 MMHG | OXYGEN SATURATION: 98 % | HEART RATE: 78 BPM | RESPIRATION RATE: 12 BRPM

## 2024-02-12 DIAGNOSIS — S20.02XA CONTUSION OF LEFT BREAST, INITIAL ENCOUNTER: Primary | ICD-10-CM

## 2024-02-12 PROCEDURE — 99213 OFFICE O/P EST LOW 20 MIN: CPT | Performed by: FAMILY MEDICINE

## 2024-02-13 NOTE — PROGRESS NOTES
(S20.02XA) Contusion of left breast, initial encounter  (primary encounter diagnosis)  Comment:     This is about 7 days old.  Bruising versus small hematoma.  Expectant management discussed.    Plan:   Probably best to manage expectantly.  Expect resolution over the next month or so.  Indications to return such as redness, increasing tenderness, fever were discussed.  Patient voices understanding of recommendations.      CHIEF COMPLAINT    Check trauma to left breast with bruising and small lump.      HISTORY    Dog pulled on the leash and pulled her into a sliding door striking the left breast.  Injury occurred about a week ago.  She notices some purpleish and then yellowish discoloration.  She feels a small lump.      EXAM  /79   Pulse 78   Temp 98.9  F (37.2  C)   Resp 12   SpO2 98%     There is partially resolved bruising in an area about 6 x 10 cm with irregular margin medial left breast.  Superiorly within this there is a thickened area about 3 cm size.  No fluctuance.  No redness or warmth.

## 2024-08-11 ENCOUNTER — HEALTH MAINTENANCE LETTER (OUTPATIENT)
Age: 44
End: 2024-08-11

## 2025-08-17 ENCOUNTER — HEALTH MAINTENANCE LETTER (OUTPATIENT)
Age: 45
End: 2025-08-17